# Patient Record
Sex: FEMALE | Race: WHITE | URBAN - NONMETROPOLITAN AREA
[De-identification: names, ages, dates, MRNs, and addresses within clinical notes are randomized per-mention and may not be internally consistent; named-entity substitution may affect disease eponyms.]

---
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
## 2022-04-09 ASSESSMENT — VISUAL ACUITY
OU_SC: 20/20
OS_SC: 20/20-
OS_SC: 20/20-1
OU_SC: 20/20
OD_SC: 20/25-
OD_SC: 20/25
OU_CC: 20/20
OD_SC: 20/25
OS_SC: 20/20-1

## 2022-04-09 ASSESSMENT — TONOMETRY
OS_IOP_MMHG: 12
OD_IOP_MMHG: 12

## 2023-01-05 ENCOUNTER — ESTABLISHED PATIENT (OUTPATIENT)
Dept: RURAL CLINIC 1 | Facility: CLINIC | Age: 23
End: 2023-01-05

## 2023-01-05 DIAGNOSIS — H52.223: ICD-10-CM

## 2023-01-05 DIAGNOSIS — H52.13: ICD-10-CM

## 2023-01-05 PROCEDURE — 92015 DETERMINE REFRACTIVE STATE: CPT

## 2023-01-05 PROCEDURE — 92014 COMPRE OPH EXAM EST PT 1/>: CPT

## 2023-01-05 PROCEDURE — 92310-E CONTACT LENS FITTING ESTABLISH PATIENT

## 2023-01-05 ASSESSMENT — TONOMETRY
OS_IOP_MMHG: 18
OD_IOP_MMHG: 17

## 2023-01-05 ASSESSMENT — VISUAL ACUITY
OS_CC: 20/20
OD_CC: 20/20
OD_CC: 20/20
OS_CC: 20/60
OD_CC: 20/20
OS_CC: 20/20